# Patient Record
Sex: FEMALE | URBAN - METROPOLITAN AREA
[De-identification: names, ages, dates, MRNs, and addresses within clinical notes are randomized per-mention and may not be internally consistent; named-entity substitution may affect disease eponyms.]

---

## 2022-02-28 ENCOUNTER — ATHLETIC TRAINING (OUTPATIENT)
Dept: SPORTS MEDICINE | Facility: OTHER | Age: 22
End: 2022-02-28

## 2022-02-28 DIAGNOSIS — S92.355A CLOSED NONDISPLACED FRACTURE OF FIFTH METATARSAL BONE OF LEFT FOOT, INITIAL ENCOUNTER: Primary | ICD-10-CM

## 2022-02-28 NOTE — PROGRESS NOTES
Athletic Training Foot/Ankle Evaluation    Name: Yael Zuniga  Age: 22 y o    School District: [employer]  Sport: Women's Ice Hockey  Date of Assessment: 2/28/2022    Assessment/Plan:     Visit Diagnosis: Closed nondisplaced fracture of fifth metatarsal bone of left foot, initial encounter Garedin Temple    Treatment Plan: Referral to Dr Chapin Meadows for imaging  []  Follow-up PRN  [x]  Follow-up prior to next practice/game for re-evaluation  []  Daily treatment/rehab  Progress note expected weekly  Referral:     []  Not needed at this time  [x]  Referred to: Dr Chapin Meadows from [facility]    []  Coaching staff notified  []  Parent/Guardian Notified    Subjective:    Date of Injury: 2/27/22    Injury occurred during:     []  Practice  []  Competition  [x]  Other:  [employer] of [employer]    Mechanism: Pt took a shot directly to the lateral aspect of their foot   The hockey puck hit right at the base of the 5th metatarsal    Previous History: No PMHx     Reported Symptoms:     [] Felt pop [] Weakness   [] Cracking or snapping [] Grinding   [] Twisted [x] Sharp pain   [x] Pain with rest [] Burning   [x] Pain with activity [] Dull or achy   [x] Pain with stairs [] Felt give way   [] Numbness or tingling [] Loss of motion     Objective:    Observation:     []  No observable findings compared bilaterally    [x] Swelling [] Callous or blister   [x] Ecchymosis [] Nail abnormality   [] Redness [] Ingrown nail   [] Deformity [] Bunion formation   [] Abnormal gait [] Pes planus   [] Pitting edema [] Pes cavus   [] Open wound [] Atrophy     Palpation: TTP over 5th metatarsal    Active Range of Motion:      Full  ROM Limited  ROM Pain  with  ROM No  Motion   Dorsiflexion [] [] [x] []   Plantarflexion [] [] [x] []   Inversion [] [] [x] []   Eversion [] [] [x] []   Great Toe Flexion [x] [] [] []   Great Toe Extension [x] [] [] []   Toe Flexion [x] [] [] []   Toe Extension [x] [] [] [] Manual Muscle Tests:     Not performed [x]             5 4+ 4 4- 3 or  Under   Dorsiflexion [] [] [] [] []   Plantarflexion [] [] [] [] []   Inversion [] [] [] [] []   Eversion [] [] [] [] []   Great Toe Flexion [] [] [] [] []   Great Toe Extension [] [] [] [] []   Toe Flexion [] [] [] [] []   Toe Extension [] [] [] [] []     Special Tests:      (+)  Laxity (+)  Pain (-)  WNL Not  Tested   Bump [] [x] [] []   Squeeze [] [x] [] []   Percussion [] [x] [] []   Tuning Fork [] [x] [] []   Anterior Drawer [] [] [] [x]   Posterior Drawer [] [] [] [x]   Talar Tilt - Inversion [] [] [] [x]   Talar Tilt - Eversion [] [] [] [x]   Kleiger [] [] [] [x]   Toe Compression [] [] [] [x]   Toe Distraction [] [] [] [x]   MTP Valgus [] [] [] [x]   MTP Varus [] [] [] [x]   Intermetatarsal Glide [] [] [] [x]   Tarsometatarsal Glide [] [] [] [x]   Tinel's [] [] [] [x]   Impingement Sign [] [] [] [x]   Burnette's (Achilles) [] [] [] [x]   Jama's Sign (DVT) [] [] [] [x]   Interdigital Neuroma [] [] [] [x]   Navicular Drop [] [] [] [x]     Treatment Log:     Date: 2/28/22   Playing Status: Out       Exercise/Treatment    Provided Crutches